# Patient Record
Sex: MALE | URBAN - METROPOLITAN AREA
[De-identification: names, ages, dates, MRNs, and addresses within clinical notes are randomized per-mention and may not be internally consistent; named-entity substitution may affect disease eponyms.]

---

## 2024-10-01 ENCOUNTER — HOSPITAL ENCOUNTER (EMERGENCY)
Facility: OTHER | Age: 39
Discharge: HOME | End: 2024-10-01
Attending: EMERGENCY MEDICINE

## 2024-10-01 VITALS
BODY MASS INDEX: 23.54 KG/M2 | RESPIRATION RATE: 19 BRPM | HEIGHT: 67 IN | OXYGEN SATURATION: 99 % | HEART RATE: 84 BPM | TEMPERATURE: 98 F | DIASTOLIC BLOOD PRESSURE: 93 MMHG | WEIGHT: 150 LBS | SYSTOLIC BLOOD PRESSURE: 182 MMHG

## 2024-10-01 DIAGNOSIS — Z11.1 SCREENING-PULMONARY TB: ICD-10-CM

## 2024-10-01 DIAGNOSIS — Z59.00 HOMELESSNESS: Primary | ICD-10-CM

## 2024-10-01 LAB
CTP QC/QA: YES
SARS-COV-2 RDRP RESP QL NAA+PROBE: NEGATIVE

## 2024-10-01 PROCEDURE — 87635 SARS-COV-2 COVID-19 AMP PRB: CPT | Performed by: EMERGENCY MEDICINE

## 2024-10-01 PROCEDURE — 99283 EMERGENCY DEPT VISIT LOW MDM: CPT | Mod: 25

## 2024-10-01 SDOH — SOCIAL DETERMINANTS OF HEALTH (SDOH): HOMELESSNESS UNSPECIFIED: Z59.00

## 2024-10-01 NOTE — ED PROVIDER NOTES
"Source of History:  Patient    Chief complaint:  Homeless (Needs clearance for shelter, denies s/s. )      HPI:  Kenney Gonzalez is a 39 y.o. male presenting with need for chest xray to be allowed entrance into the [facility].  The patient denies any cough, congestion, fever/chills or any night sweats.      This is the extent to the patients complaints today here in the emergency department.    PMH:  As per HPI and below:  History reviewed. No pertinent past medical history.  History reviewed. No pertinent surgical history.    Social History     Tobacco Use    Smoking status: Never     Passive exposure: Never    Smokeless tobacco: Never   Substance Use Topics    Alcohol use: Not Currently    Drug use: Never     Review of patient's allergies indicates:  No Known Allergies    ROS: As per HPI and below:  General: No fever, No chills.  ENT: No Cough/congestion   Head:  No headache.    Chest:  No shortness of breath.  Cardiovascular: No chest pain.  Integument: No rashes or lesions.    Physical Exam:    BP (!) 182/93 (BP Location: Left arm)   Pulse 84   Temp 98.4 °F (36.9 °C) (Oral)   Resp 19   Ht 5' 7" (1.702 m)   Wt 68 kg (150 lb)   SpO2 99%   BMI 23.49 kg/m²   Vitals:    10/01/24 1053   BP: (!) 182/93   Pulse: 84   Resp: 19   Temp: 98.4 °F (36.9 °C)   TempSrc: Oral   SpO2: 99%   Weight: 68 kg (150 lb)   Height: 5' 7" (1.702 m)       Nursing note and vital signs reviewed.  Appearance: No acute distress.  Well-appearing, nontoxic  Eyes:  No conjunctival injection.  Extraocular muscles are intact.  ENT: Oropharynx clear. No tonsillar exudate or swelling. Uvula midline and normal.  MM are pink and moist.     Chest/ Respiratory:  Clear to auscultation bilaterally.  Good air movement.  No wheezes.  No rhonchi. No rales. No accessory muscle use.  Cardiovascular:  Regular rate and rhythm.  No murmurs. No gallops. No rubs.  Musculoskeletal: Neck supple.  No meningismus.  Skin: No rashes seen.  Good turgor.  No abrasions.  " No ecchymoses.  Neuro: alert and oriented x3,  no focal neurological deficits.  Psych: Appropriate, conversant    Labs that have been ordered have been independently reviewed and interpreted by myself.  Labs Reviewed   SARS-COV-2 RDRP GENE       Result Value    POC Rapid COVID Negative       Acceptable Yes         X-Ray Chest 1 View   Final Result      No acute process seen.         Electronically signed by: Howie Watts MD   Date:    10/01/2024   Time:    11:09            Initial Impression/ Differential Dx:  Differential Diagnosis includes, but is not limited to:  meningitis, nasal foreign body, otitis media/external, bacterial sinusitis, allergic rhinitis, influenza, bacterial/viral pharyngitis, bacterial/viral pneumonia, covid-19      MDM:    39 y.o. male with need for negative cxr to be allowed into the [facility].    Chest x-ray with no focal consolidation or findings to suggest active TB.  No cardiac enlargement. Will provide patient with results. Strict instructions to follow up with primary care physician for further assessment and evaluation. Given instructions to return for any acute symptoms and verbalized understanding of this medical plan. There are significant social barriers to this patient's medical care.         Diagnostic Impression:    1. Homelessness    2. Screening-pulmonary TB         ED Disposition Condition    Discharge Stable            ED Prescriptions    None       Follow-up Information    None              Arpita Butts PA-C  10/01/24 [id number]

## 2024-10-01 NOTE — ED NOTES
Pt to ED requesting COVID and TB testing for clearance to stay at shelter. Denies s/s. AAOx4, RR even and unlabored, VSS.

## 2024-10-01 NOTE — DISCHARGE INSTRUCTIONS
Patient has tested negative for COVID and has no evidence of TB on x-ray.  They are medically cleared for admission to [facility].